# Patient Record
Sex: FEMALE | Race: ASIAN | NOT HISPANIC OR LATINO
[De-identification: names, ages, dates, MRNs, and addresses within clinical notes are randomized per-mention and may not be internally consistent; named-entity substitution may affect disease eponyms.]

---

## 2017-04-25 ENCOUNTER — APPOINTMENT (OUTPATIENT)
Dept: UROLOGY | Facility: CLINIC | Age: 62
End: 2017-04-25

## 2017-04-25 VITALS
RESPIRATION RATE: 17 BRPM | OXYGEN SATURATION: 100 % | DIASTOLIC BLOOD PRESSURE: 76 MMHG | SYSTOLIC BLOOD PRESSURE: 121 MMHG | HEIGHT: 61 IN | BODY MASS INDEX: 25.49 KG/M2 | HEART RATE: 112 BPM | WEIGHT: 135 LBS | TEMPERATURE: 98.4 F

## 2017-04-25 DIAGNOSIS — Z00.00 ENCOUNTER FOR GENERAL ADULT MEDICAL EXAMINATION W/OUT ABNORMAL FINDINGS: ICD-10-CM

## 2017-04-25 RX ORDER — RALOXIFENE HYDROCHLORIDE 60 MG/1
60 TABLET ORAL
Refills: 0 | Status: ACTIVE | COMMUNITY

## 2017-04-25 RX ORDER — MULTIVITAMIN
CAPSULE ORAL
Refills: 0 | Status: ACTIVE | COMMUNITY

## 2017-04-25 RX ORDER — PIOGLITAZONE 30 MG/1
30 TABLET ORAL
Refills: 0 | Status: ACTIVE | COMMUNITY

## 2017-04-25 RX ORDER — VITAMIN E ACETATE 670 MG
1000 CAPSULE ORAL
Refills: 0 | Status: ACTIVE | COMMUNITY

## 2017-04-25 RX ORDER — FERROUS SULFATE 325(65) MG
TABLET ORAL
Refills: 0 | Status: ACTIVE | COMMUNITY

## 2017-04-25 RX ORDER — GLIMEPIRIDE 4 MG/1
4 TABLET ORAL
Refills: 0 | Status: ACTIVE | COMMUNITY

## 2017-04-25 RX ORDER — SAXAGLIPTIN 5 MG/1
5 TABLET, FILM COATED ORAL
Refills: 0 | Status: ACTIVE | COMMUNITY

## 2017-04-25 RX ORDER — METFORMIN HYDROCHLORIDE 1000 MG/1
1000 TABLET, FILM COATED ORAL
Refills: 0 | Status: ACTIVE | COMMUNITY

## 2017-04-25 RX ORDER — CHOLECALCIFEROL (VITAMIN D3) 50 MCG
50 MCG CAPSULE ORAL
Refills: 0 | Status: ACTIVE | COMMUNITY

## 2017-04-25 RX ORDER — ASPIRIN ENTERIC COATED TABLETS 81 MG 81 MG/1
81 TABLET, DELAYED RELEASE ORAL
Refills: 0 | Status: ACTIVE | COMMUNITY

## 2017-04-25 RX ORDER — SIMVASTATIN 5 MG/1
5 TABLET, FILM COATED ORAL
Refills: 0 | Status: ACTIVE | COMMUNITY

## 2017-04-25 RX ORDER — METOPROLOL SUCCINATE 50 MG/1
50 TABLET, EXTENDED RELEASE ORAL
Refills: 0 | Status: ACTIVE | COMMUNITY

## 2017-04-25 RX ORDER — NIACIN 1000 MG/1
1000 TABLET, FILM COATED, EXTENDED RELEASE ORAL
Refills: 0 | Status: ACTIVE | COMMUNITY

## 2017-04-25 RX ORDER — FOLIC ACID 1 MG/1
1 TABLET ORAL
Refills: 0 | Status: ACTIVE | COMMUNITY

## 2017-04-30 LAB
APPEARANCE: ABNORMAL
BACTERIA: ABNORMAL
BILIRUBIN URINE: NEGATIVE
BLOOD URINE: ABNORMAL
CALCIUM OXALATE CRYSTALS: ABNORMAL
COLOR: ABNORMAL
CORE LAB FLUID CYTOLOGY: NORMAL
GLUCOSE QUALITATIVE U: NORMAL MG/DL
HYALINE CASTS: 0 /LPF
KETONES URINE: ABNORMAL
LEUKOCYTE ESTERASE URINE: ABNORMAL
MICROSCOPIC-UA: NORMAL
NITRITE URINE: NEGATIVE
PH URINE: 5.5
PROTEIN URINE: 30 MG/DL
RED BLOOD CELLS URINE: >720 /HPF
SPECIFIC GRAVITY URINE: 1.03
SQUAMOUS EPITHELIAL CELLS: 2 /HPF
UROBILINOGEN URINE: 1 MG/DL
WHITE BLOOD CELLS URINE: 18 /HPF

## 2017-05-19 ENCOUNTER — APPOINTMENT (OUTPATIENT)
Dept: UROLOGY | Facility: CLINIC | Age: 62
End: 2017-05-19

## 2017-05-19 VITALS
DIASTOLIC BLOOD PRESSURE: 76 MMHG | OXYGEN SATURATION: 100 % | RESPIRATION RATE: 17 BRPM | HEART RATE: 106 BPM | TEMPERATURE: 98.4 F | SYSTOLIC BLOOD PRESSURE: 127 MMHG

## 2017-05-19 DIAGNOSIS — N20.0 CALCULUS OF KIDNEY: ICD-10-CM

## 2017-05-22 LAB — CORE LAB FLUID CYTOLOGY: NORMAL

## 2017-05-22 RX ORDER — CIPROFLOXACIN HYDROCHLORIDE 500 MG/1
500 TABLET, FILM COATED ORAL
Qty: 2 | Refills: 0 | Status: ACTIVE | COMMUNITY
Start: 2017-05-19

## 2017-05-22 RX ORDER — CIPROFLOXACIN HYDROCHLORIDE 500 MG/1
500 TABLET, FILM COATED ORAL
Refills: 0 | Status: COMPLETED | OUTPATIENT
Start: 2017-05-22

## 2017-05-22 RX ADMIN — CIPROFLOXACIN HYDROCHLORIDE 0 MG: 500 TABLET, FILM COATED ORAL at 00:00

## 2017-09-01 ENCOUNTER — APPOINTMENT (OUTPATIENT)
Dept: UROLOGY | Facility: CLINIC | Age: 62
End: 2017-09-01
Payer: COMMERCIAL

## 2017-09-01 VITALS
RESPIRATION RATE: 18 BRPM | WEIGHT: 138 LBS | TEMPERATURE: 98.1 F | SYSTOLIC BLOOD PRESSURE: 147 MMHG | OXYGEN SATURATION: 100 % | HEART RATE: 111 BPM | DIASTOLIC BLOOD PRESSURE: 74 MMHG | BODY MASS INDEX: 26.08 KG/M2

## 2017-09-01 PROCEDURE — 52000 CYSTOURETHROSCOPY: CPT

## 2017-09-08 LAB — CORE LAB FLUID CYTOLOGY: NORMAL

## 2018-03-23 ENCOUNTER — APPOINTMENT (OUTPATIENT)
Dept: UROLOGY | Facility: CLINIC | Age: 63
End: 2018-03-23
Payer: COMMERCIAL

## 2018-03-23 VITALS
BODY MASS INDEX: 24.94 KG/M2 | TEMPERATURE: 98.8 F | HEART RATE: 124 BPM | DIASTOLIC BLOOD PRESSURE: 76 MMHG | SYSTOLIC BLOOD PRESSURE: 137 MMHG | OXYGEN SATURATION: 100 % | WEIGHT: 132 LBS | RESPIRATION RATE: 18 BRPM

## 2018-03-23 DIAGNOSIS — R31.0 GROSS HEMATURIA: ICD-10-CM

## 2018-03-23 DIAGNOSIS — R93.422 ABNORMAL RADIOLOGIC FINDINGS ON DIAGNOSITIC IMAGING OF LEFT KIDNEY: ICD-10-CM

## 2018-03-23 PROCEDURE — 99213 OFFICE O/P EST LOW 20 MIN: CPT

## 2018-03-28 PROBLEM — R31.0 HEMATURIA, GROSS: Status: ACTIVE | Noted: 2017-04-25

## 2018-03-28 PROBLEM — R93.422 ABNORMAL RADIOLOGIC FINDINGS ON DIAGNOSTIC IMAGING OF LEFT KIDNEY: Status: ACTIVE | Noted: 2017-04-25

## 2018-03-28 LAB
APPEARANCE: CLEAR
BACTERIA: ABNORMAL
BILIRUBIN URINE: NEGATIVE
BLOOD URINE: NEGATIVE
CALCIUM OXALATE CRYSTALS: ABNORMAL
COLOR: YELLOW
CORE LAB FLUID CYTOLOGY: NORMAL
GLUCOSE QUALITATIVE U: 1000 MG/DL
HYALINE CASTS: 0 /LPF
KETONES URINE: NEGATIVE
LEUKOCYTE ESTERASE URINE: NEGATIVE
MICROSCOPIC-UA: NORMAL
NITRITE URINE: NEGATIVE
PH URINE: 5.5
PROTEIN URINE: NEGATIVE MG/DL
RED BLOOD CELLS URINE: 2 /HPF
SPECIFIC GRAVITY URINE: 1.04
SQUAMOUS EPITHELIAL CELLS: 2 /HPF
URIC ACID CRYSTALS: ABNORMAL
UROBILINOGEN URINE: NEGATIVE MG/DL
WHITE BLOOD CELLS URINE: 22 /HPF

## 2018-03-29 DIAGNOSIS — N39.0 URINARY TRACT INFECTION, SITE NOT SPECIFIED: ICD-10-CM

## 2018-04-03 PROBLEM — N39.0 PYURIA: Status: ACTIVE | Noted: 2018-03-28

## 2018-04-03 LAB
ADJUSTED MITOGEN: >10 IU/ML
ADJUSTED TB AG: 0 IU/ML
APPEARANCE: CLEAR
BACTERIA UR CULT: ABNORMAL
BACTERIA: NEGATIVE
BILIRUBIN URINE: NEGATIVE
BLOOD URINE: NEGATIVE
C TRACH RRNA SPEC QL NAA+PROBE: NOT DETECTED
CALCIUM OXALATE CRYSTALS: ABNORMAL
COLOR: YELLOW
GLUCOSE QUALITATIVE U: 1000 MG/DL
HYALINE CASTS: 0 /LPF
KETONES URINE: NEGATIVE
LEUKOCYTE ESTERASE URINE: NEGATIVE
M TB IFN-G BLD-IMP: NEGATIVE
MICROSCOPIC-UA: NORMAL
N GONORRHOEA RRNA SPEC QL NAA+PROBE: NOT DETECTED
NITRITE URINE: NEGATIVE
PH URINE: 5
PROTEIN URINE: NEGATIVE MG/DL
QUANTIFERON GOLD NIL: 0.02 IU/ML
RED BLOOD CELLS URINE: 1 /HPF
SOURCE AMPLIFICATION: NORMAL
SPECIFIC GRAVITY URINE: 1.03
SQUAMOUS EPITHELIAL CELLS: 2 /HPF
URIC ACID CRYSTALS: ABNORMAL
UROBILINOGEN URINE: NEGATIVE MG/DL
WHITE BLOOD CELLS URINE: 8 /HPF

## 2018-04-03 RX ORDER — AMOXICILLIN 500 MG/1
500 TABLET, FILM COATED ORAL 3 TIMES DAILY
Qty: 15 | Refills: 0 | Status: ACTIVE | COMMUNITY
Start: 2018-04-03 | End: 1900-01-01

## 2018-04-04 ENCOUNTER — CLINICAL ADVICE (OUTPATIENT)
Age: 63
End: 2018-04-04

## 2018-04-11 LAB
MYCOPLASMA HOMINIS CULTURE: NORMAL
UREAPLASMA CULTURE: NORMAL
UREAPLASMA, PRELIMINARY CULTURE: NORMAL

## 2023-08-21 VITALS
RESPIRATION RATE: 16 BRPM | OXYGEN SATURATION: 100 % | WEIGHT: 130.07 LBS | DIASTOLIC BLOOD PRESSURE: 70 MMHG | SYSTOLIC BLOOD PRESSURE: 149 MMHG | HEART RATE: 107 BPM | HEIGHT: 62 IN | TEMPERATURE: 97 F

## 2023-08-21 RX ORDER — CHLORHEXIDINE GLUCONATE 213 G/1000ML
1 SOLUTION TOPICAL ONCE
Refills: 0 | Status: DISCONTINUED | OUTPATIENT
Start: 2023-08-25 | End: 2023-08-25

## 2023-08-21 NOTE — H&P ADULT - HISTORY OF PRESENT ILLNESS
Cardiologist: Dr. Praveen Preciado  Pharmacy  Escort:    66 yo M with PMHx of CAD, HLD, DM type II who presented to his outpt cardiologist complaining of intermittent mild substernal chest pressure, non radiating, occurring w/ mod physical exertion, lasting a few seconds and improves w/ rest, that has been progressively worsening over the past few weeks. Pt denies any ___ CP, palpitations, dizziness, syncope, diaphoresis, fatigue, LE edema, SOB, POOL, orthopnea, PND, N/V/D, abd pain, cough, congestion, fever, chills or recent sick contact.    TTE 6/24/23: EF 63%. G1DD. Mild MR. Trace TR.   NST 7/8/23: Neg stress ecg for ischemia. Mod amount of anterior wall ischemia. Normal LVSF w/o RWMA.     In light of pts risk factors, CCS class __ anginal symptoms and abnormal NST, pt now presents to [facility] for recommended cardiac catheterization with possible intervention if clinically indicated.     Cardiologist: Dr. Praveen Preciado  Pharmacy  Escort:    68 yo M with PMHx of CAD, HLD, DM type II who presented to his outpt cardiologist complaining of intermittent mild substernal chest pressure, non radiating, occurring w/ mod physical exertion, lasting a few seconds and improves w/ rest, that has been progressively worsening over the past few weeks. Pt denies any ___ CP, palpitations, dizziness, syncope, diaphoresis, fatigue, LE edema, SOB, POOL, orthopnea, PND, N/V/D, abd pain, cough, congestion, fever, chills or recent sick contact.    TTE 6/24/23: EF 63%. G1DD. Mild MR. Trace TR.   NST 7/8/23: Neg stress ecg for ischemia. Mod amount of anterior wall ischemia. Normal LVSF w/o RWMA.     In light of pts risk factors, CCS class __ anginal symptoms and abnormal NST, pt now presents to [facility] for recommended cardiac catheterization with possible intervention if clinically indicated.     Cardiologist: Dr. Praveen Preciado  Pharmacy  Escort:    68 yo M with PMHx of CAD, HLD, DM type II who presented to his outpt cardiologist complaining of intermittent mild substernal chest pressure, non radiating, occurring w/ mod physical exertion, lasting a few seconds and improves w/ rest, that has been progressively worsening over the past few weeks. Pt denies any ___ CP, palpitations, dizziness, syncope, diaphoresis, fatigue, LE edema, SOB, POOL, orthopnea, PND, N/V/D, abd pain, cough, congestion, fever, chills or recent sick contact.    TTE 6/24/23: EF 63%. G1DD. Mild MR. Trace TR.   NST 7/8/23: Neg stress ecg for ischemia. Mod amount of anterior wall ischemia. Normal LVSF w/o RWMA.     In light of pts risk factors, CCS class __ anginal symptoms and abnormal NST, pt now presents to [facility] for recommended cardiac catheterization with possible intervention if clinically indicated.     Cardiologist: Dr. Praveen Preciado  Pharmacy:  Escort:    66 yo M with PMHx of CAD, HLD, DM type II who presented to his outpt cardiologist complaining of intermittent mild substernal chest pressure, non radiating, occurring w/ mod physical exertion, lasting a few seconds and improves w/ rest, that has been progressively worsening over the past few weeks. Pt denies any ___ CP, palpitations, dizziness, syncope, diaphoresis, fatigue, LE edema, SOB, POOL, orthopnea, PND, N/V/D, abd pain, cough, congestion, fever, chills or recent sick contact.    TTE 6/24/23: EF 63%. G1DD. Mild MR. Trace TR.   NST 7/8/23: Neg stress ecg for ischemia. Mod amount of anterior wall ischemia. Normal LVSF w/o RWMA.     In light of pts risk factors, CCS class __ anginal symptoms and abnormal NST, pt now presents to [facility] for recommended cardiac catheterization with possible intervention if clinically indicated.     Cardiologist: Dr. Praveen Preciado  Pharmacy:  Escort:    66 yo M with PMHx of CAD, HLD, DM type II who presented to his outpt cardiologist complaining of intermittent mild substernal chest pressure, non radiating, occurring w/ mod physical exertion, lasting a few seconds and improves w/ rest, that has been progressively worsening over the past few weeks. Pt denies any ___ CP, palpitations, dizziness, syncope, diaphoresis, fatigue, LE edema, SOB, POOL, orthopnea, PND, N/V/D, abd pain, cough, congestion, fever, chills or recent sick contact.    TTE 6/24/23: EF 63%. G1DD. Mild MR. Trace TR.   NST 7/8/23: Neg stress ecg for ischemia. Mod amount of anterior wall ischemia. Normal LVSF w/o RWMA.     In light of pts risk factors, CCS class __ anginal symptoms and abnormal NST, pt now presents to [facility] for recommended cardiac catheterization with possible intervention if clinically indicated.     Cardiologist: Dr. Praveen Preciado  Pharmacy:  Escort:    66 yo M with PMHx of CAD, HLD, DM type II who presented to his outpt cardiologist complaining of intermittent mild substernal chest pressure, non radiating, occurring w/ mod physical exertion, lasting a few seconds and improves w/ rest, that has been progressively worsening over the past few weeks. Pt denies any ___ CP, palpitations, dizziness, syncope, diaphoresis, fatigue, LE edema, SOB, POOL, orthopnea, PND, N/V/D, abd pain, cough, congestion, fever, chills or recent sick contact.    TTE 6/24/23: EF 63%. G1DD. Mild MR. Trace TR.   NST 7/8/23: Neg stress ecg for ischemia. Mod amount of anterior wall ischemia. Normal LVSF w/o RWMA.     In light of pts risk factors, CCS class __ anginal symptoms and abnormal NST, pt now presents to [facility] for recommended cardiac catheterization with possible intervention if clinically indicated.     Cardiologist: Dr. Praveen Preciado  Pharmacy: [facility]  Escort:      66 yo English/Cantonese F with PMHx of HLD, DM type II, NORMA who presented to her outpt cardiologist complaining of intermittent mild substernal chest pressure, non radiating, occurring w/ mod physical exertion, lasting a few seconds and improves w/ rest, that has been progressively worsening over the past few weeks. Pt denies any CP, palpitations, dizziness, syncope, diaphoresis, fatigue, LE edema, SOB, POOL, orthopnea, PND, N/V/D, abd pain, cough, congestion, fever, chills or recent sick contact. TTE 6/24/23: EF 63%. G1DD. Mild MR. Trace TR. NST 7/8/23: Neg stress ecg for ischemia. Mod amount of anterior wall ischemia. Normal LVSF w/o RWMA. In light of pts risk factors, CCS class II anginal symptoms and abnormal NST, pt now presents to [facility] for recommended cardiac catheterization with possible intervention if clinically indicated. Cardiologist: Dr. Praveen Preciado  Pharmacy: [facility]  Escort:      66 yo English/Cantonese F with PMHx of HLD, DM type II, NORMA who presented to her outpt cardiologist complaining of intermittent mild substernal chest pressure, non radiating, occurring w/ mod physical exertion, lasting a few seconds and improves w/ rest, that has been progressively worsening over the past few weeks. Pt denies any CP, palpitations, dizziness, syncope, diaphoresis, fatigue, LE edema, SOB, POOL, orthopnea, PND, N/V/D, abd pain, cough, congestion, fever, chills or recent sick contact. TTE 6/24/23: EF 63%. G1DD. Mild MR. Trace TR. NST 7/8/23: Neg stress ecg for ischemia. Mod amount of anterior wall ischemia. Normal LVSF w/o RWMA. In light of pts risk factors, CCS class II anginal symptoms and abnormal NST, pt now presents to [facility] for recommended cardiac catheterization with possible intervention if clinically indicated. Cardiologist: Dr. Praveen Preciado  Pharmacy: [facility]  Escort:      68 yo English/Cantonese F with PMHx of HLD, DM type II, NORMA who presented to her outpt cardiologist complaining of intermittent mild substernal chest pressure, non radiating, occurring w/ mod physical exertion, lasting a few seconds and improves w/ rest, that has been progressively worsening over the past few weeks. Pt denies any CP, palpitations, dizziness, syncope, diaphoresis, fatigue, LE edema, SOB, POOL, orthopnea, PND, N/V/D, abd pain, cough, congestion, fever, chills or recent sick contact. TTE 6/24/23: EF 63%. G1DD. Mild MR. Trace TR. NST 7/8/23: Neg stress ecg for ischemia. Mod amount of anterior wall ischemia. Normal LVSF w/o RWMA. In light of pts risk factors, CCS class II anginal symptoms and abnormal NST, pt now presents to [facility] for recommended cardiac catheterization with possible intervention if clinically indicated. Cardiologist: Dr. Praveen Preciado  Pharmacy: [facility]  Escort:      68 yo English/Cantonese F with PMHx of HLD, DM type II, NORMA, osteoporosis who presented to her outpt cardiologist complaining of intermittent mild substernal chest pressure, non radiating, occurring w/ mod physical exertion, lasting a few seconds and improves w/ rest, that has been progressively worsening over the past few weeks. Pt denies any CP, palpitations, dizziness, syncope, diaphoresis, fatigue, LE edema, SOB, POOL, orthopnea, PND, N/V/D, abd pain, cough, congestion, fever, chills or recent sick contact. TTE 6/24/23: EF 63%. G1DD. Mild MR. Trace TR. NST 7/8/23: Neg stress ecg for ischemia. Mod amount of anterior wall ischemia. Normal LVSF w/o RWMA. In light of pts risk factors, CCS class II anginal symptoms and abnormal NST, pt now presents to [facility] for recommended cardiac catheterization with possible intervention if clinically indicated. Cardiologist: Dr. Praveen Preciado  Pharmacy: [facility]  Escort:      66 yo English/Cantonese F with PMHx of HLD, DM type II, NORMA, osteoporosis who presented to her outpt cardiologist complaining of intermittent mild substernal chest pressure, non radiating, occurring w/ mod physical exertion, lasting a few seconds and improves w/ rest, that has been progressively worsening over the past few weeks. Pt denies any CP, palpitations, dizziness, syncope, diaphoresis, fatigue, LE edema, SOB, POOL, orthopnea, PND, N/V/D, abd pain, cough, congestion, fever, chills or recent sick contact. TTE 6/24/23: EF 63%. G1DD. Mild MR. Trace TR. NST 7/8/23: Neg stress ecg for ischemia. Mod amount of anterior wall ischemia. Normal LVSF w/o RWMA. In light of pts risk factors, CCS class II anginal symptoms and abnormal NST, pt now presents to [facility] for recommended cardiac catheterization with possible intervention if clinically indicated. Cardiologist: Dr. Praveen Preciado  Pharmacy: [facility]  Escort:      66 yo English/Cantonese F with PMHx of HLD, DM type II, NORMA, osteoporosis who presented to her outpt cardiologist complaining of intermittent mild substernal chest pressure, non radiating, occurring w/ mod physical exertion, lasting a few seconds and improves w/ rest, that has been progressively worsening over the past few weeks. Pt denies any CP, palpitations, dizziness, syncope, diaphoresis, fatigue, LE edema, SOB, POOL, orthopnea, PND, N/V/D, abd pain, cough, congestion, fever, chills or recent sick contact. TTE 6/24/23: EF 63%. G1DD. Mild MR. Trace TR. NST 7/8/23: Neg stress ecg for ischemia. Mod amount of anterior wall ischemia. Normal LVSF w/o RWMA. In light of pts risk factors, CCS class II anginal symptoms and abnormal NST, pt now presents to [facility] for recommended cardiac catheterization with possible intervention if clinically indicated.

## 2023-08-21 NOTE — H&P ADULT - NSHPLABSRESULTS_GEN_ALL_CORE
ECG: ST @ 107bpm, TWI in III, biphasic V3                        11.6   6.40  )-----------( 345      ( 25 Aug 2023 13:48 )             36.9       08-25    135  |  100  |  18  ----------------------------<  156<H>  4.0   |  25  |  0.71    Ca    9.7      25 Aug 2023 14:24  Mg     1.7     08-25    TPro  7.1  /  Alb  4.1  /  TBili  0.2  /  DBili  x   /  AST  18  /  ALT  10  /  AlkPhos  55  08-25      PT/INR - ( 25 Aug 2023 13:48 )   PT: 10.6 sec;   INR: 0.93          PTT - ( 25 Aug 2023 13:48 )  PTT:32.3 sec    CARDIAC MARKERS ( 25 Aug 2023 14:24 )  x     / x     / 84 U/L / x     / 1.8 ng/mL        Urinalysis Basic - ( 25 Aug 2023 14:24 )    Color: x / Appearance: x / SG: x / pH: x  Gluc: 156 mg/dL / Ketone: x  / Bili: x / Urobili: x   Blood: x / Protein: x / Nitrite: x   Leuk Esterase: x / RBC: x / WBC x   Sq Epi: x / Non Sq Epi: x / Bacteria: x

## 2023-08-21 NOTE — H&P ADULT - ASSESSMENT
66 yo English/Cantonese F with PMHx of HLD, DM type II, NORMA, osteoporosis who in light of pts risk factors, CCS class II anginal symptoms and abnormal NST, pt now presents to [facility] for recommended cardiac catheterization with possible intervention if clinically indicated.    ASA II, Mallampati II    Hgb/HCT: 11.6/36.9. Pt denies bleeding, melena, BRBPR. hematuria. Pt reports compliance with daily Aspirin 81mg, last dose this AM. Pt ordered Plavix 600mg PO x 1 ordered pre cath.    cc bolus followed by 75 cc/hr ordered. EF 63% by ECHO. Pt is euvolemic on exam. Cr 0.71    Pt is a candidate for moderate sedation: Yes    Risks & benefits of procedure and alternative therapy have been explained to the patient including but not limited to: allergic reaction, bleeding w/possible need for blood transfusion, infection, renal and vascular compromise, limb damage, arrhythmia, stroke, vessel dissection/perforation, Myocardial infarction, emergent CABG. Informed consent obtained and in chart.   68 yo English/Cantonese F with PMHx of HLD, DM type II, NORMA, osteoporosis who in light of pts risk factors, CCS class II anginal symptoms and abnormal NST, pt now presents to [facility] for recommended cardiac catheterization with possible intervention if clinically indicated.    ASA II, Mallampati II    Hgb/HCT: 11.6/36.9. Pt denies bleeding, melena, BRBPR. hematuria. Pt reports compliance with daily Aspirin 81mg, last dose this AM. Pt ordered Plavix 600mg PO x 1 ordered pre cath.    cc bolus followed by 75 cc/hr ordered. EF 63% by ECHO. Pt is euvolemic on exam. Cr 0.71    Pt is a candidate for moderate sedation: Yes    Risks & benefits of procedure and alternative therapy have been explained to the patient including but not limited to: allergic reaction, bleeding w/possible need for blood transfusion, infection, renal and vascular compromise, limb damage, arrhythmia, stroke, vessel dissection/perforation, Myocardial infarction, emergent CABG. Informed consent obtained and in chart.   68 yo English/Cantonese F with PMHx of HLD, DM type II, NORMA, osteoporosis who in light of pts risk factors, CCS class II anginal symptoms and abnormal NST, pt now presents to [facility] for recommended cardiac catheterization with possible intervention if clinically indicated.    ASA II, Mallampati II    Hgb/HCT: 11.6/36.9. Pt denies bleeding, melena, BRBPR. hematuria. Pt reports compliance with daily Aspirin 81mg, last dose this AM. Pt ordered Plavix 600mg PO x 1 ordered pre cath.    cc bolus followed by 75 cc/hr ordered. EF 63% by ECHO. Pt is euvolemic on exam. Cr 0.71    Pt is a candidate for moderate sedation: Yes    Risks & benefits of procedure and alternative therapy have been explained to the patient including but not limited to: allergic reaction, bleeding w/possible need for blood transfusion, infection, renal and vascular compromise, limb damage, arrhythmia, stroke, vessel dissection/perforation, Myocardial infarction, emergent CABG. Informed consent obtained and in chart.

## 2023-08-25 ENCOUNTER — OUTPATIENT (OUTPATIENT)
Dept: OUTPATIENT SERVICES | Facility: HOSPITAL | Age: 68
LOS: 1 days | Discharge: HOME | End: 2023-08-25
Payer: MEDICARE

## 2023-08-25 DIAGNOSIS — Z98.890 OTHER SPECIFIED POSTPROCEDURAL STATES: Chronic | ICD-10-CM

## 2023-08-25 LAB
A1C WITH ESTIMATED AVERAGE GLUCOSE RESULT: 7.2 % — HIGH (ref 4–5.6)
ALBUMIN SERPL ELPH-MCNC: 4.1 G/DL — SIGNIFICANT CHANGE UP (ref 3.3–5)
ALP SERPL-CCNC: 55 U/L — SIGNIFICANT CHANGE UP (ref 40–120)
ALT FLD-CCNC: 10 U/L — SIGNIFICANT CHANGE UP (ref 10–45)
ANION GAP SERPL CALC-SCNC: 10 MMOL/L — SIGNIFICANT CHANGE UP (ref 5–17)
ANISOCYTOSIS BLD QL: SLIGHT — SIGNIFICANT CHANGE UP
APTT BLD: 32.3 SEC — SIGNIFICANT CHANGE UP (ref 24.5–35.6)
AST SERPL-CCNC: 18 U/L — SIGNIFICANT CHANGE UP (ref 10–40)
BASOPHILS # BLD AUTO: 0 K/UL — SIGNIFICANT CHANGE UP (ref 0–0.2)
BASOPHILS NFR BLD AUTO: 0 % — SIGNIFICANT CHANGE UP (ref 0–2)
BILIRUB SERPL-MCNC: 0.2 MG/DL — SIGNIFICANT CHANGE UP (ref 0.2–1.2)
BUN SERPL-MCNC: 18 MG/DL — SIGNIFICANT CHANGE UP (ref 7–23)
CALCIUM SERPL-MCNC: 9.7 MG/DL — SIGNIFICANT CHANGE UP (ref 8.4–10.5)
CHLORIDE SERPL-SCNC: 100 MMOL/L — SIGNIFICANT CHANGE UP (ref 96–108)
CHOLEST SERPL-MCNC: 151 MG/DL — SIGNIFICANT CHANGE UP
CK MB CFR SERPL CALC: 1.8 NG/ML — SIGNIFICANT CHANGE UP (ref 0–6.7)
CK SERPL-CCNC: 84 U/L — SIGNIFICANT CHANGE UP (ref 25–170)
CO2 SERPL-SCNC: 25 MMOL/L — SIGNIFICANT CHANGE UP (ref 22–31)
CREAT SERPL-MCNC: 0.71 MG/DL — SIGNIFICANT CHANGE UP (ref 0.5–1.3)
DACRYOCYTES BLD QL SMEAR: SLIGHT — SIGNIFICANT CHANGE UP
EGFR: 93 ML/MIN/1.73M2 — SIGNIFICANT CHANGE UP
EOSINOPHIL # BLD AUTO: 0 K/UL — SIGNIFICANT CHANGE UP (ref 0–0.5)
EOSINOPHIL NFR BLD AUTO: 0 % — SIGNIFICANT CHANGE UP (ref 0–6)
ESTIMATED AVERAGE GLUCOSE: 160 MG/DL — HIGH (ref 68–114)
GIANT PLATELETS BLD QL SMEAR: PRESENT — SIGNIFICANT CHANGE UP
GLUCOSE BLDC GLUCOMTR-MCNC: 144 MG/DL — HIGH (ref 70–99)
GLUCOSE SERPL-MCNC: 156 MG/DL — HIGH (ref 70–99)
HCT VFR BLD CALC: 36.9 % — SIGNIFICANT CHANGE UP (ref 34.5–45)
HDLC SERPL-MCNC: 56 MG/DL — SIGNIFICANT CHANGE UP
HGB BLD-MCNC: 11.6 G/DL — SIGNIFICANT CHANGE UP (ref 11.5–15.5)
HYPOCHROMIA BLD QL: SIGNIFICANT CHANGE UP
INR BLD: 0.93 — SIGNIFICANT CHANGE UP (ref 0.85–1.18)
LIPID PNL WITH DIRECT LDL SERPL: 74 MG/DL — SIGNIFICANT CHANGE UP
LYMPHOCYTES # BLD AUTO: 1.37 K/UL — SIGNIFICANT CHANGE UP (ref 1–3.3)
LYMPHOCYTES # BLD AUTO: 21.4 % — SIGNIFICANT CHANGE UP (ref 13–44)
MACROCYTES BLD QL: SLIGHT — SIGNIFICANT CHANGE UP
MAGNESIUM SERPL-MCNC: 1.7 MG/DL — SIGNIFICANT CHANGE UP (ref 1.6–2.6)
MANUAL SMEAR VERIFICATION: SIGNIFICANT CHANGE UP
MCHC RBC-ENTMCNC: 20.9 PG — LOW (ref 27–34)
MCHC RBC-ENTMCNC: 31.4 GM/DL — LOW (ref 32–36)
MCV RBC AUTO: 66.6 FL — LOW (ref 80–100)
MICROCYTES BLD QL: SLIGHT — SIGNIFICANT CHANGE UP
MONOCYTES # BLD AUTO: 0.12 K/UL — SIGNIFICANT CHANGE UP (ref 0–0.9)
MONOCYTES NFR BLD AUTO: 1.8 % — LOW (ref 2–14)
NEUTROPHILS # BLD AUTO: 4.92 K/UL — SIGNIFICANT CHANGE UP (ref 1.8–7.4)
NEUTROPHILS NFR BLD AUTO: 76.8 % — SIGNIFICANT CHANGE UP (ref 43–77)
NON HDL CHOLESTEROL: 95 MG/DL — SIGNIFICANT CHANGE UP
OVALOCYTES BLD QL SMEAR: SLIGHT — SIGNIFICANT CHANGE UP
PLAT MORPH BLD: ABNORMAL
PLATELET # BLD AUTO: 345 K/UL — SIGNIFICANT CHANGE UP (ref 150–400)
POIKILOCYTOSIS BLD QL AUTO: SLIGHT — SIGNIFICANT CHANGE UP
POLYCHROMASIA BLD QL SMEAR: SLIGHT — SIGNIFICANT CHANGE UP
POTASSIUM SERPL-MCNC: 4 MMOL/L — SIGNIFICANT CHANGE UP (ref 3.5–5.3)
POTASSIUM SERPL-SCNC: 4 MMOL/L — SIGNIFICANT CHANGE UP (ref 3.5–5.3)
PROT SERPL-MCNC: 7.1 G/DL — SIGNIFICANT CHANGE UP (ref 6–8.3)
PROTHROM AB SERPL-ACNC: 10.6 SEC — SIGNIFICANT CHANGE UP (ref 9.5–13)
RBC # BLD: 5.54 M/UL — HIGH (ref 3.8–5.2)
RBC # FLD: 15.9 % — HIGH (ref 10.3–14.5)
RBC BLD AUTO: ABNORMAL
SCHISTOCYTES BLD QL AUTO: SLIGHT — SIGNIFICANT CHANGE UP
SMUDGE CELLS # BLD: PRESENT — SIGNIFICANT CHANGE UP
SODIUM SERPL-SCNC: 135 MMOL/L — SIGNIFICANT CHANGE UP (ref 135–145)
TRIGL SERPL-MCNC: 106 MG/DL — SIGNIFICANT CHANGE UP
WBC # BLD: 6.4 K/UL — SIGNIFICANT CHANGE UP (ref 3.8–10.5)
WBC # FLD AUTO: 6.4 K/UL — SIGNIFICANT CHANGE UP (ref 3.8–10.5)

## 2023-08-25 PROCEDURE — C1769: CPT

## 2023-08-25 PROCEDURE — 83036 HEMOGLOBIN GLYCOSYLATED A1C: CPT

## 2023-08-25 PROCEDURE — 93458 L HRT ARTERY/VENTRICLE ANGIO: CPT | Mod: 26

## 2023-08-25 PROCEDURE — 82962 GLUCOSE BLOOD TEST: CPT

## 2023-08-25 PROCEDURE — 83735 ASSAY OF MAGNESIUM: CPT

## 2023-08-25 PROCEDURE — 93005 ELECTROCARDIOGRAM TRACING: CPT

## 2023-08-25 PROCEDURE — C1894: CPT

## 2023-08-25 PROCEDURE — 85730 THROMBOPLASTIN TIME PARTIAL: CPT

## 2023-08-25 PROCEDURE — 36415 COLL VENOUS BLD VENIPUNCTURE: CPT

## 2023-08-25 PROCEDURE — 85025 COMPLETE CBC W/AUTO DIFF WBC: CPT

## 2023-08-25 PROCEDURE — C1887: CPT

## 2023-08-25 PROCEDURE — 99152 MOD SED SAME PHYS/QHP 5/>YRS: CPT

## 2023-08-25 PROCEDURE — 82553 CREATINE MB FRACTION: CPT

## 2023-08-25 PROCEDURE — 85610 PROTHROMBIN TIME: CPT

## 2023-08-25 PROCEDURE — 80053 COMPREHEN METABOLIC PANEL: CPT

## 2023-08-25 PROCEDURE — 93010 ELECTROCARDIOGRAM REPORT: CPT

## 2023-08-25 PROCEDURE — 82550 ASSAY OF CK (CPK): CPT

## 2023-08-25 PROCEDURE — 93458 L HRT ARTERY/VENTRICLE ANGIO: CPT

## 2023-08-25 PROCEDURE — 80061 LIPID PANEL: CPT

## 2023-08-25 RX ORDER — DEXTROSE 50 % IN WATER 50 %
25 SYRINGE (ML) INTRAVENOUS ONCE
Refills: 0 | Status: DISCONTINUED | OUTPATIENT
Start: 2023-08-25 | End: 2023-09-09

## 2023-08-25 RX ORDER — SODIUM CHLORIDE 9 MG/ML
1000 INJECTION, SOLUTION INTRAVENOUS
Refills: 0 | Status: DISCONTINUED | OUTPATIENT
Start: 2023-08-25 | End: 2023-09-09

## 2023-08-25 RX ORDER — DEXTROSE 50 % IN WATER 50 %
12.5 SYRINGE (ML) INTRAVENOUS ONCE
Refills: 0 | Status: DISCONTINUED | OUTPATIENT
Start: 2023-08-25 | End: 2023-09-09

## 2023-08-25 RX ORDER — SODIUM CHLORIDE 9 MG/ML
500 INJECTION INTRAMUSCULAR; INTRAVENOUS; SUBCUTANEOUS
Refills: 0 | Status: DISCONTINUED | OUTPATIENT
Start: 2023-08-25 | End: 2023-09-09

## 2023-08-25 RX ORDER — DEXTROSE 50 % IN WATER 50 %
15 SYRINGE (ML) INTRAVENOUS ONCE
Refills: 0 | Status: DISCONTINUED | OUTPATIENT
Start: 2023-08-25 | End: 2023-09-09

## 2023-08-25 RX ORDER — INSULIN LISPRO 100/ML
VIAL (ML) SUBCUTANEOUS ONCE
Refills: 0 | Status: DISCONTINUED | OUTPATIENT
Start: 2023-08-25 | End: 2023-09-09

## 2023-08-25 RX ORDER — INSULIN LISPRO 100/ML
VIAL (ML) SUBCUTANEOUS ONCE
Refills: 0 | Status: DISCONTINUED | OUTPATIENT
Start: 2023-08-25 | End: 2023-08-25

## 2023-08-25 RX ORDER — CLOPIDOGREL BISULFATE 75 MG/1
600 TABLET, FILM COATED ORAL ONCE
Refills: 0 | Status: COMPLETED | OUTPATIENT
Start: 2023-08-25 | End: 2023-08-25

## 2023-08-25 RX ORDER — GLUCAGON INJECTION, SOLUTION 0.5 MG/.1ML
1 INJECTION, SOLUTION SUBCUTANEOUS ONCE
Refills: 0 | Status: DISCONTINUED | OUTPATIENT
Start: 2023-08-25 | End: 2023-09-09

## 2023-08-25 RX ORDER — SODIUM CHLORIDE 9 MG/ML
250 INJECTION INTRAMUSCULAR; INTRAVENOUS; SUBCUTANEOUS ONCE
Refills: 0 | Status: COMPLETED | OUTPATIENT
Start: 2023-08-25 | End: 2023-08-25

## 2023-08-25 RX ADMIN — SODIUM CHLORIDE 500 MILLILITER(S): 9 INJECTION INTRAMUSCULAR; INTRAVENOUS; SUBCUTANEOUS at 15:10

## 2023-08-25 RX ADMIN — SODIUM CHLORIDE 75 MILLILITER(S): 9 INJECTION INTRAMUSCULAR; INTRAVENOUS; SUBCUTANEOUS at 15:10

## 2023-08-25 RX ADMIN — CLOPIDOGREL BISULFATE 600 MILLIGRAM(S): 75 TABLET, FILM COATED ORAL at 15:10

## 2023-08-25 NOTE — PROGRESS NOTE ADULT - SUBJECTIVE AND OBJECTIVE BOX
Interventional Cardiology PA SDA Discharge Note    Patient without complaints. Ambulated and voided without difficulties    Afebrile, VSS    Ext: Right Radial:  No hematoma, no bleeding, dressing; C/D/I  Pulses:    intact RAD/DP/PT to baseline     A/P:  66 yo English/Cantonese F with PMHx of HLD, DM type II, NORMA, osteoporosis who in light of pts risk factors, CCS class II anginal symptoms and abnormal NST, pt now presents to [facility] for recommended cardiac catheterization with possible intervention if clinically indicated.    Pt is s/p Diagnostic Cath 8/25/23 revealing non-obs CAD throughout, EDP 8, R radial accessed, hemostasis successfully achieved.  Pt is to resume home medications as previously prescribed.     1.	Stable for discharge as per attending Dr. Sánchez after bed rest, pt voids, groin/wrist stable and 30 minutes of ambulation.  2.	Follow-up with Cardiologist, Dr Preciado in 1-2 weeks  3.	Discharged forms signed and copies in chart    Interventional Cardiology PA SDA Discharge Note    Patient without complaints. Ambulated and voided without difficulties    Afebrile, VSS    Ext: Right Radial:  No hematoma, no bleeding, dressing; C/D/I  Pulses:    intact RAD/DP/PT to baseline     A/P:  66 yo English/Cantonese F with PMHx of HLD, DM type II, NORMA, osteoporosis who in light of pts risk factors, CCS class II anginal symptoms and abnormal NST, pt now presents to [facility] for recommended cardiac catheterization with possible intervention if clinically indicated.    Pt is s/p Diagnostic Cath 8/25/23 revealing non-obs CAD throughout, EDP 8, R radial accessed, hemostasis successfully achieved.  Pt is to resume home medications as previously prescribed.     1.	Stable for discharge as per attending Dr. Sánchez after bed rest, pt voids, groin/wrist stable and 30 minutes of ambulation.  2.	Follow-up with Cardiologist, Dr Preciado in 1-2 weeks  3.	Discharged forms signed and copies in chart    Interventional Cardiology PA SDA Discharge Note    Patient without complaints. Ambulated and voided without difficulties    Afebrile, VSS    Ext: Right Radial:  No hematoma, no bleeding, dressing; C/D/I  Pulses:    intact RAD/DP/PT to baseline     A/P:  68 yo English/Cantonese F with PMHx of HLD, DM type II, NORMA, osteoporosis who in light of pts risk factors, CCS class II anginal symptoms and abnormal NST, pt now presents to [facility] for recommended cardiac catheterization with possible intervention if clinically indicated.    Pt is s/p Diagnostic Cath 8/25/23 revealing non-obs CAD throughout, EDP 8, R radial accessed, hemostasis successfully achieved.  Pt is to resume home medications as previously prescribed.     1.	Stable for discharge as per attending Dr. Sánchez after bed rest, pt voids, groin/wrist stable and 30 minutes of ambulation.  2.	Follow-up with Cardiologist, Dr Preciado in 1-2 weeks  3.	Discharged forms signed and copies in chart    Interventional Cardiology PA SDA Discharge Note    Patient without complaints. Ambulated and voided without difficulties    Afebrile, VSS    Ext: Right Radial:  No hematoma, no bleeding, dressing; C/D/I  Pulses:    intact RAD/DP/PT to baseline     A/P:  66 yo English/Cantonese F with PMHx of HLD, DM type II, NORMA, osteoporosis who in light of pts risk factors, CCS class II anginal symptoms and abnormal NST, pt now presents to [facility] for recommended cardiac catheterization with possible intervention as clinically indicated.    Pt is s/p Diagnostic Cath 8/25/23 revealing mLAD 30% diffuse Dz.  oLCx 30% stenosis, dRCA 40% Dz. LM minor irreg  EDP 8, R radial accessed, hemostasis successfully achieved.  Pt is to hold Metformin x 2 days and restart on Monday, 8/28/23.   She is to resume home medications as previously prescribed.     1.	Stable for discharge as per attending Dr. Sánchez after bed rest, pt voids, groin/wrist stable and 30 minutes of ambulation.  2.	Follow-up with Cardiologist, Dr Preciado in 1-2 weeks  3.	Discharged forms signed and copies in chart    Interventional Cardiology PA SDA Discharge Note    Patient without complaints. Ambulated and voided without difficulties    Afebrile, VSS    Ext: Right Radial:  No hematoma, no bleeding, dressing; C/D/I  Pulses:    intact RAD/DP/PT to baseline     A/P:  66 yo English/Cantonese F with PMHx of HLD, DM type II, NORMA, osteoporosis who in light of pts risk factors, CCS class II anginal symptoms and abnormal NST, pt now presents to [facility] for recommended cardiac catheterization with possible intervention as clinically indicated.    Pt is s/p Diagnostic Cath 8/25/23 revealing mLAD 30% diffuse Dz.  oLCx 30% stenosis, dRCA 40% Dz. LM minor irreg  EDP 8, R radial accessed, hemostasis successfully achieved.  Pt is to hold Metformin x 2 days and restart on Monday, 8/28/23.   She is to resume home medications as previously prescribed.     1.	Stable for discharge as per attending Dr. Sánchez after bed rest, pt voids, groin/wrist stable and 30 minutes of ambulation.  2.	Follow-up with Cardiologist, Dr Preciado in 1-2 weeks  3.	Discharged forms signed and copies in chart    Interventional Cardiology PA SDA Discharge Note    Patient without complaints. Ambulated and voided without difficulties    Afebrile, VSS    Ext: Right Radial:  No hematoma, no bleeding, dressing; C/D/I  Pulses:    intact RAD/DP/PT to baseline     A/P:  68 yo English/Cantonese F with PMHx of HLD, DM type II, NORMA, osteoporosis who in light of pts risk factors, CCS class II anginal symptoms and abnormal NST, pt now presents to [facility] for recommended cardiac catheterization with possible intervention as clinically indicated.    Pt is s/p Diagnostic Cath 8/25/23 revealing mLAD 30% diffuse Dz.  oLCx 30% stenosis, dRCA 40% Dz. LM minor irreg  EDP 8, R radial accessed, hemostasis successfully achieved.  Pt is to hold Metformin x 2 days and restart on Monday, 8/28/23.   She is to resume home medications as previously prescribed.     1.	Stable for discharge as per attending Dr. Sánchez after bed rest, pt voids, groin/wrist stable and 30 minutes of ambulation.  2.	Follow-up with Cardiologist, Dr Preciado in 1-2 weeks  3.	Discharged forms signed and copies in chart

## 2023-09-01 DIAGNOSIS — R94.39 ABNORMAL RESULT OF OTHER CARDIOVASCULAR FUNCTION STUDY: ICD-10-CM

## 2023-09-01 DIAGNOSIS — I25.110 ATHEROSCLEROTIC HEART DISEASE OF NATIVE CORONARY ARTERY WITH UNSTABLE ANGINA PECTORIS: ICD-10-CM

## 2023-09-01 DIAGNOSIS — Z82.49 FAMILY HISTORY OF ISCHEMIC HEART DISEASE AND OTHER DISEASES OF THE CIRCULATORY SYSTEM: ICD-10-CM

## 2023-12-22 RX ORDER — RALOXIFENE HYDROCHLORIDE 60 MG/1
1 TABLET, COATED ORAL
Refills: 0 | DISCHARGE

## 2023-12-22 RX ORDER — PIOGLITAZONE HYDROCHLORIDE 15 MG/1
1 TABLET ORAL
Refills: 0 | DISCHARGE

## 2023-12-22 RX ORDER — GLIMEPIRIDE 1 MG
1 TABLET ORAL
Refills: 0 | DISCHARGE

## 2023-12-22 RX ORDER — VITAMIN E 100 UNIT
1 CAPSULE ORAL
Refills: 0 | DISCHARGE

## 2023-12-22 RX ORDER — SAXAGLIPTIN 5 MG/1
1 TABLET, FILM COATED ORAL
Refills: 0 | DISCHARGE

## 2023-12-22 RX ORDER — METFORMIN HYDROCHLORIDE 850 MG/1
1 TABLET ORAL
Refills: 0 | DISCHARGE

## 2023-12-22 RX ORDER — ASPIRIN/CALCIUM CARB/MAGNESIUM 324 MG
1 TABLET ORAL
Refills: 0 | DISCHARGE

## 2023-12-22 RX ORDER — METOPROLOL TARTRATE 50 MG
1 TABLET ORAL
Refills: 0 | DISCHARGE

## 2023-12-22 RX ORDER — FERROUS SULFATE 325(65) MG
1 TABLET ORAL
Refills: 0 | DISCHARGE

## 2023-12-22 RX ORDER — NIACIN 50 MG
1 TABLET ORAL
Refills: 0 | DISCHARGE

## 2023-12-22 RX ORDER — FOLIC ACID 0.8 MG
1 TABLET ORAL
Refills: 0 | DISCHARGE

## 2023-12-22 RX ORDER — SIMVASTATIN 20 MG/1
1 TABLET, FILM COATED ORAL
Refills: 0 | DISCHARGE